# Patient Record
Sex: FEMALE | Race: OTHER | HISPANIC OR LATINO
[De-identification: names, ages, dates, MRNs, and addresses within clinical notes are randomized per-mention and may not be internally consistent; named-entity substitution may affect disease eponyms.]

---

## 2021-04-12 ENCOUNTER — TRANSCRIPTION ENCOUNTER (OUTPATIENT)
Age: 46
End: 2021-04-12

## 2022-01-22 ENCOUNTER — EMERGENCY (EMERGENCY)
Facility: HOSPITAL | Age: 47
LOS: 1 days | Discharge: HOME | End: 2022-01-22
Attending: EMERGENCY MEDICINE | Admitting: EMERGENCY MEDICINE
Payer: COMMERCIAL

## 2022-01-22 VITALS
OXYGEN SATURATION: 100 % | DIASTOLIC BLOOD PRESSURE: 56 MMHG | HEART RATE: 59 BPM | TEMPERATURE: 98 F | SYSTOLIC BLOOD PRESSURE: 101 MMHG | RESPIRATION RATE: 17 BRPM

## 2022-01-22 VITALS
HEART RATE: 78 BPM | RESPIRATION RATE: 18 BRPM | TEMPERATURE: 98 F | SYSTOLIC BLOOD PRESSURE: 137 MMHG | DIASTOLIC BLOOD PRESSURE: 82 MMHG | OXYGEN SATURATION: 100 %

## 2022-01-22 LAB
ALBUMIN SERPL ELPH-MCNC: 4.6 G/DL — SIGNIFICANT CHANGE UP (ref 3.3–5)
ALP SERPL-CCNC: 72 U/L — SIGNIFICANT CHANGE UP (ref 40–120)
ALT FLD-CCNC: 14 U/L — SIGNIFICANT CHANGE UP (ref 4–33)
ANION GAP SERPL CALC-SCNC: 11 MMOL/L — SIGNIFICANT CHANGE UP (ref 7–14)
APPEARANCE UR: ABNORMAL
AST SERPL-CCNC: 15 U/L — SIGNIFICANT CHANGE UP (ref 4–32)
BASOPHILS # BLD AUTO: 0.04 K/UL — SIGNIFICANT CHANGE UP (ref 0–0.2)
BASOPHILS NFR BLD AUTO: 0.3 % — SIGNIFICANT CHANGE UP (ref 0–2)
BILIRUB SERPL-MCNC: 0.6 MG/DL — SIGNIFICANT CHANGE UP (ref 0.2–1.2)
BILIRUB UR-MCNC: NEGATIVE — SIGNIFICANT CHANGE UP
BUN SERPL-MCNC: 14 MG/DL — SIGNIFICANT CHANGE UP (ref 7–23)
CALCIUM SERPL-MCNC: 9.3 MG/DL — SIGNIFICANT CHANGE UP (ref 8.4–10.5)
CHLORIDE SERPL-SCNC: 100 MMOL/L — SIGNIFICANT CHANGE UP (ref 98–107)
CO2 SERPL-SCNC: 25 MMOL/L — SIGNIFICANT CHANGE UP (ref 22–31)
COLOR SPEC: YELLOW — SIGNIFICANT CHANGE UP
CREAT SERPL-MCNC: 0.7 MG/DL — SIGNIFICANT CHANGE UP (ref 0.5–1.3)
DIFF PNL FLD: ABNORMAL
EOSINOPHIL # BLD AUTO: 0.07 K/UL — SIGNIFICANT CHANGE UP (ref 0–0.5)
EOSINOPHIL NFR BLD AUTO: 0.6 % — SIGNIFICANT CHANGE UP (ref 0–6)
EPI CELLS # UR: 4 /HPF — SIGNIFICANT CHANGE UP (ref 0–5)
GLUCOSE SERPL-MCNC: 115 MG/DL — HIGH (ref 70–99)
GLUCOSE UR QL: NEGATIVE — SIGNIFICANT CHANGE UP
HCT VFR BLD CALC: 40.1 % — SIGNIFICANT CHANGE UP (ref 34.5–45)
HGB BLD-MCNC: 13.5 G/DL — SIGNIFICANT CHANGE UP (ref 11.5–15.5)
IANC: 10.1 K/UL — HIGH (ref 1.5–8.5)
IMM GRANULOCYTES NFR BLD AUTO: 0.2 % — SIGNIFICANT CHANGE UP (ref 0–1.5)
KETONES UR-MCNC: ABNORMAL
LEUKOCYTE ESTERASE UR-ACNC: NEGATIVE — SIGNIFICANT CHANGE UP
LIDOCAIN IGE QN: 36 U/L — SIGNIFICANT CHANGE UP (ref 7–60)
LYMPHOCYTES # BLD AUTO: 1.2 K/UL — SIGNIFICANT CHANGE UP (ref 1–3.3)
LYMPHOCYTES # BLD AUTO: 10.2 % — LOW (ref 13–44)
MCHC RBC-ENTMCNC: 30.3 PG — SIGNIFICANT CHANGE UP (ref 27–34)
MCHC RBC-ENTMCNC: 33.7 GM/DL — SIGNIFICANT CHANGE UP (ref 32–36)
MCV RBC AUTO: 89.9 FL — SIGNIFICANT CHANGE UP (ref 80–100)
MONOCYTES # BLD AUTO: 0.37 K/UL — SIGNIFICANT CHANGE UP (ref 0–0.9)
MONOCYTES NFR BLD AUTO: 3.1 % — SIGNIFICANT CHANGE UP (ref 2–14)
NEUTROPHILS # BLD AUTO: 10.1 K/UL — HIGH (ref 1.8–7.4)
NEUTROPHILS NFR BLD AUTO: 85.6 % — HIGH (ref 43–77)
NITRITE UR-MCNC: NEGATIVE — SIGNIFICANT CHANGE UP
NRBC # BLD: 0 /100 WBCS — SIGNIFICANT CHANGE UP
NRBC # FLD: 0 K/UL — SIGNIFICANT CHANGE UP
PH UR: 8.5 — HIGH (ref 5–8)
PLATELET # BLD AUTO: 352 K/UL — SIGNIFICANT CHANGE UP (ref 150–400)
POTASSIUM SERPL-MCNC: 4.3 MMOL/L — SIGNIFICANT CHANGE UP (ref 3.5–5.3)
POTASSIUM SERPL-SCNC: 4.3 MMOL/L — SIGNIFICANT CHANGE UP (ref 3.5–5.3)
PROT SERPL-MCNC: 7.3 G/DL — SIGNIFICANT CHANGE UP (ref 6–8.3)
PROT UR-MCNC: ABNORMAL
RBC # BLD: 4.46 M/UL — SIGNIFICANT CHANGE UP (ref 3.8–5.2)
RBC # FLD: 13.9 % — SIGNIFICANT CHANGE UP (ref 10.3–14.5)
RBC CASTS # UR COMP ASSIST: 35 /HPF — HIGH (ref 0–4)
SARS-COV-2 RNA SPEC QL NAA+PROBE: SIGNIFICANT CHANGE UP
SODIUM SERPL-SCNC: 136 MMOL/L — SIGNIFICANT CHANGE UP (ref 135–145)
SP GR SPEC: 1.03 — SIGNIFICANT CHANGE UP (ref 1–1.05)
UROBILINOGEN FLD QL: SIGNIFICANT CHANGE UP
WBC # BLD: 11.8 K/UL — HIGH (ref 3.8–10.5)
WBC # FLD AUTO: 11.8 K/UL — HIGH (ref 3.8–10.5)
WBC UR QL: 3 /HPF — SIGNIFICANT CHANGE UP (ref 0–5)

## 2022-01-22 PROCEDURE — 99285 EMERGENCY DEPT VISIT HI MDM: CPT

## 2022-01-22 PROCEDURE — 74176 CT ABD & PELVIS W/O CONTRAST: CPT | Mod: 26,MA

## 2022-01-22 RX ORDER — SODIUM CHLORIDE 9 MG/ML
1000 INJECTION, SOLUTION INTRAVENOUS ONCE
Refills: 0 | Status: COMPLETED | OUTPATIENT
Start: 2022-01-22 | End: 2022-01-22

## 2022-01-22 RX ORDER — MORPHINE SULFATE 50 MG/1
4 CAPSULE, EXTENDED RELEASE ORAL ONCE
Refills: 0 | Status: DISCONTINUED | OUTPATIENT
Start: 2022-01-22 | End: 2022-01-22

## 2022-01-22 RX ORDER — ONDANSETRON 8 MG/1
4 TABLET, FILM COATED ORAL ONCE
Refills: 0 | Status: COMPLETED | OUTPATIENT
Start: 2022-01-22 | End: 2022-01-22

## 2022-01-22 RX ORDER — KETOROLAC TROMETHAMINE 30 MG/ML
15 SYRINGE (ML) INJECTION ONCE
Refills: 0 | Status: DISCONTINUED | OUTPATIENT
Start: 2022-01-22 | End: 2022-01-22

## 2022-01-22 RX ADMIN — Medication 15 MILLIGRAM(S): at 14:47

## 2022-01-22 RX ADMIN — SODIUM CHLORIDE 1000 MILLILITER(S): 9 INJECTION, SOLUTION INTRAVENOUS at 11:29

## 2022-01-22 RX ADMIN — SODIUM CHLORIDE 1000 MILLILITER(S): 9 INJECTION, SOLUTION INTRAVENOUS at 12:12

## 2022-01-22 NOTE — ED PROVIDER NOTE - CLINICAL SUMMARY MEDICAL DECISION MAKING FREE TEXT BOX
Anibal Najera, PGY-2- 46 year old female with a pmhx of sinusitis, tummy tuck, here for evaluation of RLQ abd pain, rt flank pain. Vitals stable on arrival. Point tender to RLQ. Pt otherwise well-appearing. Plan to obtain cbc, cmp, lipase, ua, ucx, hcg, treat with IVF, zofran, pain meds. Pt pain resolved after urinating in ED. CT abd/pelv with iv contrast changed to ct abd/pelv without contrast. suspect renal stone

## 2022-01-22 NOTE — ED PROVIDER NOTE - PHYSICAL EXAMINATION
General: well appearing, no acute distress, AOx3  Skin: no rash, no pallor  Head: normocephalic, atraumatic  Eyes: clear conjunctiva, EOMI  ENMT: airway patent, no nasal discharge  Cardiovascular: normal rate, normal rhythm, S1/S2  Pulmonary: clear to auscultation bilaterally, no rales, rhonchi, or wheeze  Abdomen: soft, point tender at RLQ, mild CVA tenderness  : Chaperoned by ED ANDREAS Obrien - no CMT, no adnexal masses/tenderness appreciated, I&D site performed at rt labia minora at OSH yesterday is well-appearing, non tender, no erythema   Musculoskeletal: moving extremities well, no deformity  Psych: normal mood, normal affect

## 2022-01-22 NOTE — ED PROVIDER NOTE - OBJECTIVE STATEMENT
46 year old female with a pmhx of sinusitis, on Duprixin (MAB therapy), tummy tuck,  presents to ED for evaluation of R flank pain radiating to RLQ that began this morning at 0300. Patient reports pain woke her up from sleep. Took a combination Advil/Acetaminophen, which helped her pain. Had a few episodes of nausea and vomiting with pain. Went to  who referred her to ED. Pt reports pain gradually improving since arrival to ED. Has not taken pain meds since 0400. No fever, chills, cp, sob, cough, vaginal bleeding, dysuria. LMP finished 2 days ago. No hx of renal stones. NKDA. 46 year old female with a pmhx of sinusitis, on Duprixin (MAB therapy), tummy tuck,  presents to ED for evaluation of R flank pain radiating to RLQ that began this morning at 0300. Patient reports pain woke her up from sleep. Took a combination Advil/Acetaminophen, which helped her pain. Had a few episodes of nausea and vomiting with pain. Went to  who referred her to ED. Pt reports pain gradually improving since arrival to ED. Has not taken pain meds since 0400. No fever, chills, cp, sob, cough, vaginal bleeding, dysuria. LMP finished 2 days ago. No hx of renal stones. NKDA.    Attendinyo female presents with right flank pain which has been sharp, constant and severe since 3am today.  no fever or chills.  pain not worse with movement.  of note, pt states she went to the bathroom just prior to my evaluation and pt states pain abruptly resolved.  no pain at this time.  had some burning with urination.

## 2022-01-22 NOTE — ED ADULT NURSE REASSESSMENT NOTE - NS ED NURSE REASSESS COMMENT FT1
Pt denies pain at this time stating "I haven't had pain for three hours". Pt afebrile at this time. Pt refusing toradol medication. Will continue to monitor

## 2022-01-22 NOTE — ED PROVIDER NOTE - PROGRESS NOTE DETAILS
Anibal Najera, PGY-2- Patient urinated in ED, states her pain is immediately resolved. Has no pain now and declined Morphine. Pt received IVF, suspect passed renal stone in ED. Will switch CT to non-con. Abd is soft, nontender Anibal Najera, PGY-2- Patient with 2 mm distal ureter stone on rt. Pt refused Toradol. Will dc home with urine strainer and urology f/u. Discussed results of work up with patient. Patient agrees with plan to discharge home. All questions answered regarding plan. Strict return precautions given.

## 2022-01-22 NOTE — ED ADULT NURSE NOTE - CHIEF COMPLAINT QUOTE
Pt AOX4 c/o Right-sided flank pain; sent from [facility] for further eval; woke her up at 0400, had 2-3 episodes of vomiting; pt having urinary frequency but low output; pt taking Flonase and Duprixin x 3 months sinusitis/polyps; No PSHx

## 2022-01-22 NOTE — ED PROVIDER NOTE - NSFOLLOWUPINSTRUCTIONS_ED_ALL_ED_FT
1) Follow up with your PCP and a urologist within 1 week of being seen in the Emergency Department   2) Return to the ED immediately for new or worsening symptoms severe pain, vomiting, unable to eat/drink, unable to urinate, fever   3) Please continue to take any home medications as prescribed  4) Your test results from your ED visit were discussed with you prior to discharge  5) You were provided with a copy of your test results  6) Please take Ibuprofen 400 mg every 6 hours and Tylenol 650 mg every 6 hours for pain. Please follow all pharmacy packaging instructions and warnings. Please take the Ibuprofen with food. Please drink plenty if water and stay hydrated to help pass the stone. Please strain urine at home. Bring stone to urologist with you if you pass it.   7) Incidental finding on CT- 3 mm right middle lobe pulmonary nodule, please follow up with PCP for further evaluation and surveillance

## 2022-01-22 NOTE — ED PROVIDER NOTE - NSFOLLOWUPCLINICS_GEN_ALL_ED_FT
[facility]  Urology  [address] [place] [address]  Phone: [phone]  Fax:   Follow Up Time: 4-6 Days

## 2022-01-22 NOTE — ED PROVIDER NOTE - NS ED ROS FT
General: no fever, no chills  Eyes: no vision changes, no eye pain  Cardiovascular: no chest pain, no edema  Respiratory: no cough, no shortness of breath  Gastrointestinal: +abdominal pain, +flank pain, +nausea, +vomiting, no diarrhea  Genitourinary: +dysuria, no hematuria  Musculoskeletal: no muscle pain, no joint pain  Skin: no rash, no lesions  Neuro: no numbness, no tingling  Psych: no depression, no anxiety

## 2022-01-22 NOTE — ED PROVIDER NOTE - PATIENT PORTAL LINK FT
You can access the FollowMyHealth Patient Portal offered by [facility] by registering at the following website: [email or web address]. By joining [facility]’s FollowMyHealth portal, you will also be able to view your health information using other applications (apps) compatible with our system.

## 2022-01-22 NOTE — ED ADULT TRIAGE NOTE - CHIEF COMPLAINT QUOTE
Pt AOX4 c/o Right-sided flank pain; woke her up at 0400, had 2-3 episodes of vomiting; pt having urinary frequency but low output; pt taking Flonase and Duprixin x 3 months sinusitis/polyps Pt AOX4 c/o Right-sided flank pain; sent from [facility] for further eval; woke her up at 0400, had 2-3 episodes of vomiting; pt having urinary frequency but low output; pt taking Flonase and Duprixin x 3 months sinusitis/polyps; No PSHx

## 2022-01-23 LAB
CULTURE RESULTS: SIGNIFICANT CHANGE UP
SPECIMEN SOURCE: SIGNIFICANT CHANGE UP

## 2022-01-26 PROBLEM — J32.9 CHRONIC SINUSITIS, UNSPECIFIED: Chronic | Status: ACTIVE | Noted: 2022-01-22

## 2022-02-01 ENCOUNTER — APPOINTMENT (OUTPATIENT)
Dept: UROLOGY | Facility: CLINIC | Age: 47
End: 2022-02-01
Payer: COMMERCIAL

## 2022-02-01 DIAGNOSIS — Z87.09 PERSONAL HISTORY OF OTHER DISEASES OF THE RESPIRATORY SYSTEM: ICD-10-CM

## 2022-02-01 DIAGNOSIS — Z80.8 FAMILY HISTORY OF MALIGNANT NEOPLASM OF OTHER ORGANS OR SYSTEMS: ICD-10-CM

## 2022-02-01 DIAGNOSIS — F17.210 NICOTINE DEPENDENCE, CIGARETTES, UNCOMPLICATED: ICD-10-CM

## 2022-02-01 PROBLEM — Z00.00 ENCOUNTER FOR PREVENTIVE HEALTH EXAMINATION: Status: ACTIVE | Noted: 2022-02-01

## 2022-02-01 PROCEDURE — 99204 OFFICE O/P NEW MOD 45 MIN: CPT

## 2022-02-01 PROCEDURE — 99406 BEHAV CHNG SMOKING 3-10 MIN: CPT

## 2022-02-01 PROCEDURE — 99203 OFFICE O/P NEW LOW 30 MIN: CPT

## 2022-02-01 RX ORDER — FLUTICASONE PROPIONATE 50 UG/1
50 SPRAY, METERED NASAL
Qty: 16 | Refills: 0 | Status: ACTIVE | COMMUNITY
Start: 2021-10-19

## 2022-02-01 RX ORDER — DUPILUMAB 300 MG/2ML
300 INJECTION, SOLUTION SUBCUTANEOUS
Qty: 12 | Refills: 0 | Status: ACTIVE | COMMUNITY
Start: 2021-12-15

## 2022-02-01 RX ORDER — AMOXICILLIN AND CLAVULANATE POTASSIUM 875; 125 MG/1; MG/1
875-125 TABLET, COATED ORAL
Qty: 20 | Refills: 0 | Status: COMPLETED | COMMUNITY
Start: 2021-10-12

## 2022-02-01 NOTE — REVIEW OF SYSTEMS
[Negative] : Heme/Lymph [FreeTextEntry6] : kidney stones on right side / burning sensation with urinating

## 2022-02-01 NOTE — ASSESSMENT
[FreeTextEntry1] : Ms. Garduno is a 46 year old woman here today for evaluation for right ureteral stone, right flank pain\par Patient was counseled on smoking cessation for 5 minutes.  We discussed various health benefits of quitting.  We discussed the potential Urologic implications of smoking, including an increased risk of bladder and upper tract urothelial carcinoma as well as kidney cancer.\par She reports that 2 weeks ago she went to the ED for severe right side pain.\par She reports she was vomiting as well, no fevers or chills.\par UC on 1/23/22 was negative.\par CT images reviewed demonstrating a 2 mm distal right ureteral stone with mild right hydronephrosis.\par She reports the her pain has since resolved but occasionally feels slight discomfort.\par She reports dysuria but denies hematuria.\par \par UC today to rule out infection.\par Tamsulosin 0.4 mg to help for passage of stone.\par I discussed the risks, benefits, alternatives, and possible side effects of alpha blocker therapy with the patient, including but not limited to dizziness, lightheadedness, headache, blurred vision with the patient. I also discussed that the patient must inform her ophthalmologist that she has taken an alpha blocker prior to undergoing cataract or glaucoma surgery.\par CT without contrast for evaluation of stone passage.\par RTO in 3 weeks.\par Follow up with PCP about incidental finding of 3mm lung nodule.  We discussed the potential etiology of this, especially in a smoker\par

## 2022-02-01 NOTE — HISTORY OF PRESENT ILLNESS
[Currently Experiencing ___] :  [unfilled] [Flank Pain] : flank pain [Dysuria] : dysuria [None] : None [FreeTextEntry1] : Ms. Garduno is a 46 year old woman here today for evaluation for right ureteral stone and right flank pain.\par She reports that 2 weeks ago she went to the ED for severe right sided flank pain.\par She reports she was vomiting as well, no fevers or chills.\par UC on 1/23/22 was negative.\par CT on 1/23/22 showed 2 mm distal right ureteral stone with mild right hydronephrosis.\par She reports the her pain has since resolved but occasionally feels slight discomfort.\par She reports dysuria but denies hematuria.\par Reports that she drinks 7-10 12 oz water bottles per day.\par She is currently straining her urine but has not seen a stone pass.  She reports recurrent pain approximately 1 week ago.\par She denies familial history of kidney stones.\par Reports that she smokes 3-4 cigarettes a day.\par \par

## 2022-02-03 LAB — BACTERIA UR CULT: NORMAL

## 2022-02-08 ENCOUNTER — RESULT REVIEW (OUTPATIENT)
Age: 47
End: 2022-02-08

## 2022-02-28 ENCOUNTER — APPOINTMENT (OUTPATIENT)
Dept: UROLOGY | Facility: CLINIC | Age: 47
End: 2022-02-28

## 2022-03-07 ENCOUNTER — APPOINTMENT (OUTPATIENT)
Dept: UROLOGY | Facility: CLINIC | Age: 47
End: 2022-03-07
Payer: COMMERCIAL

## 2022-03-07 VITALS
RESPIRATION RATE: 12 BRPM | HEART RATE: 79 BPM | WEIGHT: 142 LBS | SYSTOLIC BLOOD PRESSURE: 121 MMHG | DIASTOLIC BLOOD PRESSURE: 76 MMHG | TEMPERATURE: 96.3 F | BODY MASS INDEX: 27.88 KG/M2 | OXYGEN SATURATION: 98 % | HEIGHT: 60 IN

## 2022-03-07 DIAGNOSIS — N20.1 CALCULUS OF URETER: ICD-10-CM

## 2022-03-07 DIAGNOSIS — R10.9 UNSPECIFIED ABDOMINAL PAIN: ICD-10-CM

## 2022-03-07 DIAGNOSIS — Z87.442 PERSONAL HISTORY OF URINARY CALCULI: ICD-10-CM

## 2022-03-07 PROCEDURE — 99213 OFFICE O/P EST LOW 20 MIN: CPT

## 2022-03-07 NOTE — ASSESSMENT
[FreeTextEntry1] : Very pleasant 46-year-old woman presents for follow-up of right ureteral stone status post spontaneous stone passage\par -CT images from [facility] radiology reviewed demonstrating no kidney or ureteral stones\par -We discussed general stone prevention strategies\par -We discussed option of perform a work-up for stones at this time, however she would like to defer this\par -Repeat renal ultrasound in 6 months

## 2022-03-07 NOTE — HISTORY OF PRESENT ILLNESS
[FreeTextEntry1] : Very pleasant 46-year-old woman presents for follow-up of right flank pain and right ureteral stone.  She recently went to the emergency department complaining of right flank pain.  A CT scan was performed which demonstrated a 2 mm distal right ureteral stone.  Currently she reports no pain.  She recently underwent a repeat CT scan which demonstrates resolution of hydronephrosis and no evidence of a kidney or ureteral stone.  No other complaints.

## 2022-08-10 ENCOUNTER — APPOINTMENT (OUTPATIENT)
Dept: INTERNAL MEDICINE | Facility: CLINIC | Age: 47
End: 2022-08-10
Payer: COMMERCIAL

## 2022-08-10 ENCOUNTER — NON-APPOINTMENT (OUTPATIENT)
Age: 47
End: 2022-08-10

## 2022-08-10 VITALS
HEART RATE: 72 BPM | TEMPERATURE: 98 F | OXYGEN SATURATION: 98 % | SYSTOLIC BLOOD PRESSURE: 96 MMHG | WEIGHT: 143.06 LBS | BODY MASS INDEX: 28.09 KG/M2 | HEIGHT: 60 IN | DIASTOLIC BLOOD PRESSURE: 65 MMHG

## 2022-08-10 DIAGNOSIS — L30.9 DERMATITIS, UNSPECIFIED: ICD-10-CM

## 2022-08-10 DIAGNOSIS — H92.09 OTALGIA, UNSPECIFIED EAR: ICD-10-CM

## 2022-08-10 PROCEDURE — 99214 OFFICE O/P EST MOD 30 MIN: CPT | Mod: 25

## 2022-08-10 PROCEDURE — 99213 OFFICE O/P EST LOW 20 MIN: CPT

## 2022-08-10 PROCEDURE — 99396 PREV VISIT EST AGE 40-64: CPT | Mod: 25

## 2022-08-10 PROCEDURE — 36415 COLL VENOUS BLD VENIPUNCTURE: CPT

## 2022-08-10 RX ORDER — CEPHALEXIN 500 MG/1
500 CAPSULE ORAL
Qty: 14 | Refills: 0 | Status: DISCONTINUED | COMMUNITY
Start: 2022-07-13

## 2022-08-10 RX ORDER — AZELASTINE HYDROCHLORIDE 137 UG/1
0.1 SPRAY, METERED NASAL
Qty: 30 | Refills: 0 | Status: DISCONTINUED | COMMUNITY
Start: 2022-06-30

## 2022-08-10 RX ORDER — PREDNISONE 20 MG/1
20 TABLET ORAL
Qty: 21 | Refills: 0 | Status: DISCONTINUED | COMMUNITY
Start: 2021-10-19 | End: 2022-08-10

## 2022-08-10 RX ORDER — MOMETASONE FUROATE 1 MG/G
0.1 OINTMENT TOPICAL
Qty: 45 | Refills: 0 | Status: DISCONTINUED | COMMUNITY
Start: 2022-08-03

## 2022-08-10 RX ORDER — IBUPROFEN 600 MG/1
600 TABLET, FILM COATED ORAL 3 TIMES DAILY
Qty: 63 | Refills: 0 | Status: DISCONTINUED | COMMUNITY
Start: 2022-02-01 | End: 2022-08-10

## 2022-08-10 RX ORDER — AZITHROMYCIN 250 MG/1
250 TABLET, FILM COATED ORAL
Qty: 6 | Refills: 0 | Status: DISCONTINUED | COMMUNITY
Start: 2022-06-30

## 2022-08-10 RX ORDER — CLOBETASOL PROPIONATE 0.5 MG/G
0.05 CREAM TOPICAL
Qty: 60 | Refills: 0 | Status: DISCONTINUED | COMMUNITY
Start: 2022-07-13

## 2022-08-10 RX ORDER — OLOPATADINE HCL 1 MG/ML
0.1 SOLUTION/ DROPS OPHTHALMIC
Qty: 5 | Refills: 0 | Status: DISCONTINUED | COMMUNITY
Start: 2021-11-24 | End: 2022-08-10

## 2022-08-10 RX ORDER — LEVOFLOXACIN 500 MG/1
500 TABLET, FILM COATED ORAL
Qty: 10 | Refills: 0 | Status: DISCONTINUED | COMMUNITY
Start: 2022-03-08

## 2022-08-10 NOTE — HEALTH RISK ASSESSMENT
[Good] : ~his/her~  mood as  good [Current] : Current [Yes] : Yes [Monthly or less (1 pt)] : Monthly or less (1 point) [1 or 2 (0 pts)] : 1 or 2 (0 points) [Never (0 pts)] : Never (0 points) [No] : In the past 12 months have you used drugs other than those required for medical reasons? No [0] : 2) Feeling down, depressed, or hopeless: Not at all (0) [PHQ-2 Negative - No further assessment needed] : PHQ-2 Negative - No further assessment needed [Patient reported mammogram was normal] : Patient reported mammogram was normal [Audit-CScore] : 1 [OBS5Gitir] : 0 [MammogramDate] : 2021

## 2022-08-10 NOTE — ADDENDUM
[FreeTextEntry1] : I, Helga Garcia, acted as a scribe on behalf of Dr. Joseph Fry MD, on 08/10/2022. \par \par All medical entries made by the scribe were at my, Dr. Joseph Fry MD, direction and personally dictated by me on 08/10/2022. I have reviewed the chart and agree that the record accurately reflects my personal performance of the history, physical exam, assessment and plan. I have also personally directed, reviewed, and agreed with the chart.

## 2022-08-10 NOTE — ASSESSMENT
[FreeTextEntry1] : Annual Physical Exam\par -Blood work done today\par -Check A1c, lipid panel and Vitamin levels.\par -EKG done today with no abnormalities.\par -CT Scan ordered given lung nodule and smoking Hx likely benign given size. Advised to see pulmonology for further evaluation.\par -Advised to f/u with urology given increased frequency. Referral provided.\par -GI referral given for colonoscopy.\par -RTO annually or as needed

## 2022-08-10 NOTE — HISTORY OF PRESENT ILLNESS
[FreeTextEntry1] : Patient presents to establish care and annual physical exam.  [de-identified] : A 47 y/o pt presents to office with Hx of sinusitis and kidney stone.\par Pt reports she went to ED for kidney stone back in 6 months. Had XR and incidental finding of nodule on lung.\par States she saw urologist and has passed the kidney stone.\par Does cough sometimes. Previously smoked 1-2 cigarettes a day. Currently smokes 3-4 cigarettes a day. Smoking for about 19 years. \par Has tried nicotine patch in the past with no success.\par Also Numbness and tingling of the hands and arm. Denies any weakness.\par \par Pt notes of recurring sinus infection, does have polyp. Had sinus surgery in the past. Unable to smell well.\par Also c/o Increased frequency urination. Does wake up 2-3 x at night to urinate\par Plans to f/u with urologist. Drinks 1 cup of coffee once a day\par Notes of L ear itching for the past few days. Denies any earache.\par States she gained weight. She has been watching her diet more closely and stays active. Taking vitamin supplementation.\par Saw dermatology for small lump on foot. Cream given with no alleviation. \par Has burning sensation upon palpation. Denies itchiness.\par She has been using Vaseline, with minimal alleviation.\par UTD with mammogram annually and GYN. Interested in colonoscopy.\par FHx: Sister Thyroid Ca,

## 2022-08-10 NOTE — REVIEW OF SYSTEMS
[Negative] : Heme/Lymph [Cough] : cough [FreeTextEntry4] : L ear itching  [FreeTextEntry8] : Increased frequency [de-identified] : Small lump on L foot.

## 2022-08-10 NOTE — PHYSICAL EXAM
[No Acute Distress] : no acute distress [Well Nourished] : well nourished [Well Developed] : well developed [Well-Appearing] : well-appearing [Normal Sclera/Conjunctiva] : normal sclera/conjunctiva [PERRL] : pupils equal round and reactive to light [EOMI] : extraocular movements intact [Normal Outer Ear/Nose] : the outer ears and nose were normal in appearance [Normal Oropharynx] : the oropharynx was normal [No JVD] : no jugular venous distention [No Lymphadenopathy] : no lymphadenopathy [Supple] : supple [Thyroid Normal, No Nodules] : the thyroid was normal and there were no nodules present [No Respiratory Distress] : no respiratory distress  [No Accessory Muscle Use] : no accessory muscle use [Clear to Auscultation] : lungs were clear to auscultation bilaterally [Normal Rate] : normal rate  [Regular Rhythm] : with a regular rhythm [Normal S1, S2] : normal S1 and S2 [No Murmur] : no murmur heard [No Carotid Bruits] : no carotid bruits [No Abdominal Bruit] : a ~M bruit was not heard ~T in the abdomen [No Varicosities] : no varicosities [Pedal Pulses Present] : the pedal pulses are present [No Edema] : there was no peripheral edema [No Palpable Aorta] : no palpable aorta [No Extremity Clubbing/Cyanosis] : no extremity clubbing/cyanosis [Soft] : abdomen soft [Non Tender] : non-tender [Non-distended] : non-distended [No Masses] : no abdominal mass palpated [No HSM] : no HSM [Normal Bowel Sounds] : normal bowel sounds [Normal Posterior Cervical Nodes] : no posterior cervical lymphadenopathy [Normal Anterior Cervical Nodes] : no anterior cervical lymphadenopathy [No CVA Tenderness] : no CVA  tenderness [No Spinal Tenderness] : no spinal tenderness [No Joint Swelling] : no joint swelling [Grossly Normal Strength/Tone] : grossly normal strength/tone [No Rash] : no rash [Coordination Grossly Intact] : coordination grossly intact [No Focal Deficits] : no focal deficits [Normal Gait] : normal gait [Deep Tendon Reflexes (DTR)] : deep tendon reflexes were 2+ and symmetric [Normal Affect] : the affect was normal [Normal Insight/Judgement] : insight and judgment were intact [de-identified] : patch medial aspect right foot

## 2022-08-15 LAB
25(OH)D3 SERPL-MCNC: 28.6 NG/ML
ALBUMIN SERPL ELPH-MCNC: 4.9 G/DL
ALP BLD-CCNC: 68 U/L
ALT SERPL-CCNC: 15 U/L
ANION GAP SERPL CALC-SCNC: 12 MMOL/L
APPEARANCE: CLEAR
AST SERPL-CCNC: 14 U/L
BACTERIA: NEGATIVE
BASOPHILS # BLD AUTO: 0.06 K/UL
BASOPHILS NFR BLD AUTO: 0.8 %
BILIRUB SERPL-MCNC: 0.5 MG/DL
BILIRUBIN URINE: NEGATIVE
BLOOD URINE: NEGATIVE
BUN SERPL-MCNC: 11 MG/DL
CALCIUM OXALATE CRYSTALS: ABNORMAL
CALCIUM SERPL-MCNC: 10.5 MG/DL
CHLORIDE SERPL-SCNC: 103 MMOL/L
CHOLEST SERPL-MCNC: 282 MG/DL
CO2 SERPL-SCNC: 26 MMOL/L
COLOR: YELLOW
CREAT SERPL-MCNC: 0.77 MG/DL
EGFR: 97 ML/MIN/1.73M2
EOSINOPHIL # BLD AUTO: 0.78 K/UL
EOSINOPHIL NFR BLD AUTO: 10.3 %
ESTIMATED AVERAGE GLUCOSE: 108 MG/DL
GLUCOSE QUALITATIVE U: NEGATIVE
GLUCOSE SERPL-MCNC: 76 MG/DL
HBA1C MFR BLD HPLC: 5.4 %
HCT VFR BLD CALC: 41.9 %
HDLC SERPL-MCNC: 77 MG/DL
HGB BLD-MCNC: 13.6 G/DL
HYALINE CASTS: 1 /LPF
IMM GRANULOCYTES NFR BLD AUTO: 0.3 %
KETONES URINE: NORMAL
LDLC SERPL CALC-MCNC: 167 MG/DL
LEUKOCYTE ESTERASE URINE: NEGATIVE
LYMPHOCYTES # BLD AUTO: 2.32 K/UL
LYMPHOCYTES NFR BLD AUTO: 30.7 %
MAN DIFF?: NORMAL
MCHC RBC-ENTMCNC: 29.9 PG
MCHC RBC-ENTMCNC: 32.5 GM/DL
MCV RBC AUTO: 92.1 FL
MICROSCOPIC-UA: NORMAL
MONOCYTES # BLD AUTO: 0.48 K/UL
MONOCYTES NFR BLD AUTO: 6.4 %
NEUTROPHILS # BLD AUTO: 3.89 K/UL
NEUTROPHILS NFR BLD AUTO: 51.5 %
NITRITE URINE: NEGATIVE
NONHDLC SERPL-MCNC: 205 MG/DL
PH URINE: 6
PLATELET # BLD AUTO: 337 K/UL
POTASSIUM SERPL-SCNC: 4.7 MMOL/L
PROT SERPL-MCNC: 7.3 G/DL
PROTEIN URINE: ABNORMAL
RBC # BLD: 4.55 M/UL
RBC # FLD: 14.5 %
RED BLOOD CELLS URINE: 4 /HPF
SODIUM SERPL-SCNC: 140 MMOL/L
SPECIFIC GRAVITY URINE: 1.04
SQUAMOUS EPITHELIAL CELLS: 1 /HPF
TRIGL SERPL-MCNC: 190 MG/DL
TSH SERPL-ACNC: 1.76 UIU/ML
URINE COMMENTS: NORMAL
UROBILINOGEN URINE: NORMAL
VIT B12 SERPL-MCNC: >2000 PG/ML
WBC # FLD AUTO: 7.55 K/UL
WHITE BLOOD CELLS URINE: 0 /HPF

## 2022-08-18 ENCOUNTER — NON-APPOINTMENT (OUTPATIENT)
Age: 47
End: 2022-08-18

## 2022-08-24 ENCOUNTER — APPOINTMENT (OUTPATIENT)
Dept: CT IMAGING | Facility: CLINIC | Age: 47
End: 2022-08-24

## 2022-09-01 ENCOUNTER — APPOINTMENT (OUTPATIENT)
Dept: INTERNAL MEDICINE | Facility: CLINIC | Age: 47
End: 2022-09-01

## 2022-09-01 VITALS
SYSTOLIC BLOOD PRESSURE: 120 MMHG | BODY MASS INDEX: 29.38 KG/M2 | TEMPERATURE: 96.6 F | HEIGHT: 59.45 IN | DIASTOLIC BLOOD PRESSURE: 79 MMHG | HEART RATE: 74 BPM | OXYGEN SATURATION: 98 % | WEIGHT: 147.71 LBS

## 2022-09-01 DIAGNOSIS — S01.91XA LACERATION W/OUT FOREIGN BODY OF UNSPECIFIED PART OF HEAD, INITIAL ENCOUNTER: ICD-10-CM

## 2022-09-01 PROCEDURE — 99214 OFFICE O/P EST MOD 30 MIN: CPT

## 2022-09-02 NOTE — PHYSICAL EXAM
[Normal] : normal gait, coordination grossly intact, no focal deficits and deep tendon reflexes were 2+ and symmetric [de-identified] : Laceration left frontal region sutures seen, swelling under the left eye temporal region and frontal head

## 2022-09-02 NOTE — HISTORY OF PRESENT ILLNESS
[FreeTextEntry8] : Patient presents to the office after sustaining a fall, fall occurred getting up from a chair and tripping fell on head and lacerated head, states there was sure covering top of flowerpot.  Had sutures placed in [place], denies any fevers chills discharge was given anti-inflammatory and antibiotic.  Continues to have headache, in the left frontal region denies any nausea gait instability

## 2022-09-02 NOTE — ASSESSMENT
[FreeTextEntry1] : Wound appears well-healed, did advise to follow-up with plastic surgery for cosmetic purposes, to get [place] CT [place], patient continues to have headache rule out subdural hematoma. Name of plastic surgeon provided

## 2022-09-06 ENCOUNTER — APPOINTMENT (OUTPATIENT)
Dept: INTERNAL MEDICINE | Facility: CLINIC | Age: 47
End: 2022-09-06

## 2022-09-06 VITALS
BODY MASS INDEX: 28.45 KG/M2 | SYSTOLIC BLOOD PRESSURE: 109 MMHG | DIASTOLIC BLOOD PRESSURE: 73 MMHG | WEIGHT: 143 LBS | HEIGHT: 59.45 IN | HEART RATE: 67 BPM | OXYGEN SATURATION: 99 % | TEMPERATURE: 96.9 F

## 2022-09-06 PROCEDURE — 99213 OFFICE O/P EST LOW 20 MIN: CPT

## 2022-09-06 NOTE — ASSESSMENT
[FreeTextEntry1] : -Advised to use Vit E oil, Aloe vera for the wound.\par -Pt has a scheduled CT Scan in 1 week, sutures removed patient tolerated procedure well.\par -RTO in 2 days for suture removal of last 2 sutures

## 2022-09-06 NOTE — ADDENDUM
[FreeTextEntry1] : I, Helga Garcia, acted as a scribe on behalf of Dr. Joseph Fry MD, on 09/06/2022. \par \par All medical entries made by the scribe were at my, Dr. Joseph Fry MD, direction and personally dictated by me on 09/06/2022. I have reviewed the chart and agree that the record accurately reflects my personal performance of the history, physical exam, assessment and plan. I have also personally directed, reviewed, and agreed with the chart.

## 2022-09-06 NOTE — HISTORY OF PRESENT ILLNESS
[FreeTextEntry8] : Patient presents to office for suture removal.\par Pt is doing well overall and plans to go to plastic surgeon.\par Continues to have decreased sensation of the head particularly on frontal area.\par Reports she will be having CT Scan in 1 week.\par Pt will return to work on Thursday

## 2022-09-06 NOTE — PHYSICAL EXAM
[Normal] : no acute distress, well nourished, well developed and well-appearing [de-identified] : wound left frontal region, well healed, NO ttp, or erythema

## 2022-09-08 ENCOUNTER — APPOINTMENT (OUTPATIENT)
Dept: INTERNAL MEDICINE | Facility: CLINIC | Age: 47
End: 2022-09-08

## 2022-09-08 DIAGNOSIS — Z48.02 ENCOUNTER FOR REMOVAL OF SUTURES: ICD-10-CM

## 2022-09-08 PROCEDURE — 99213 OFFICE O/P EST LOW 20 MIN: CPT

## 2022-09-08 NOTE — ADDENDUM
[FreeTextEntry1] : I, Helga Garcia, acted as a scribe on behalf of Dr. Joseph Fry MD, on 09/08/2022. \par \par All medical entries made by the scribe were at my, Dr. Joseph Fry MD, direction and personally dictated by me on 09/08/2022. I have reviewed the chart and agree that the record accurately reflects my personal performance of the history, physical exam, assessment and plan. I have also personally directed, reviewed, and agreed with the chart.

## 2022-09-08 NOTE — HISTORY OF PRESENT ILLNESS
[FreeTextEntry1] : Patient presents for follow-up for suture removal. [de-identified] : Patient reports she still gets pounding headache and Lump on L forehead.\par She will return to work today.\par

## 2022-09-08 NOTE — PHYSICAL EXAM
[Normal] : no acute distress, well nourished, well developed and well-appearing [de-identified] : wound left upper forehead, well healed

## 2022-09-08 NOTE — ASSESSMENT
[FreeTextEntry1] : -Suture removed. Pt tolerated the procedure.\par -Continue to ice the area.\par -Pt will return to work today.\par

## 2022-09-16 ENCOUNTER — NON-APPOINTMENT (OUTPATIENT)
Age: 47
End: 2022-09-16

## 2022-09-22 ENCOUNTER — APPOINTMENT (OUTPATIENT)
Dept: INTERNAL MEDICINE | Facility: CLINIC | Age: 47
End: 2022-09-22

## 2022-09-22 VITALS
HEART RATE: 67 BPM | HEIGHT: 59.44 IN | WEIGHT: 143 LBS | TEMPERATURE: 97.9 F | DIASTOLIC BLOOD PRESSURE: 72 MMHG | BODY MASS INDEX: 28.45 KG/M2 | OXYGEN SATURATION: 98 % | SYSTOLIC BLOOD PRESSURE: 105 MMHG

## 2022-09-22 DIAGNOSIS — G44.309 POST-TRAUMATIC HEADACHE, UNSPECIFIED, NOT INTRACTABLE: ICD-10-CM

## 2022-09-22 PROCEDURE — 99213 OFFICE O/P EST LOW 20 MIN: CPT

## 2022-09-22 NOTE — ADDENDUM
[FreeTextEntry1] : I, Helga Garcia, acted as a scribe on behalf of Dr. Joseph Fry MD, on 09/22/2022. \par \par All medical entries made by the scribe were at my, Dr. Joseph Fry MD, direction and personally dictated by me on 09/22/2022. I have reviewed the chart and agree that the record accurately reflects my personal performance of the history, physical exam, assessment and plan. I have also personally directed, reviewed, and agreed with the chart.

## 2022-09-22 NOTE — PHYSICAL EXAM
[Normal] : normal gait, coordination grossly intact, no focal deficits and deep tendon reflexes were 2+ and symmetric [de-identified] : Well healed scar on L-forehead.

## 2022-09-22 NOTE — ASSESSMENT
[FreeTextEntry1] : Likely tension headache. stress, anxiety contributing.\par -Pt had 2 CT Scan which was unremarkable.\par -Declined talk therapy or medication.\par -Name of neurologist provided for further evaluation and recommendation.

## 2022-09-22 NOTE — HISTORY OF PRESENT ILLNESS
[FreeTextEntry8] : Patient c/o Worsening pounding headache particularly on frontal region a/w blurry vision while working. wishes to see neurologist. \par Occurs particularly at work. Took aspirin and currently have heartburn,\par Has tried Advil and Tylenol with no alleviation.\par Does have Nausea, vomiting. Denies any fever, neck stiffness, sore throat\par Reports she is Anxious at work and public transportations. 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

## 2022-10-13 ENCOUNTER — APPOINTMENT (OUTPATIENT)
Dept: GASTROENTEROLOGY | Facility: CLINIC | Age: 47
End: 2022-10-13

## 2022-10-13 VITALS
DIASTOLIC BLOOD PRESSURE: 66 MMHG | TEMPERATURE: 97.5 F | HEART RATE: 80 BPM | SYSTOLIC BLOOD PRESSURE: 104 MMHG | WEIGHT: 145 LBS | RESPIRATION RATE: 12 BRPM | OXYGEN SATURATION: 96 % | BODY MASS INDEX: 29.23 KG/M2 | HEIGHT: 59 IN

## 2022-10-13 DIAGNOSIS — Z12.11 ENCOUNTER FOR SCREENING FOR MALIGNANT NEOPLASM OF COLON: ICD-10-CM

## 2022-10-13 PROCEDURE — 99204 OFFICE O/P NEW MOD 45 MIN: CPT

## 2022-10-13 NOTE — CONSULT LETTER
[Dear  ___] : Dear  [unfilled], [Consult Letter:] : I had the pleasure of evaluating your patient, [unfilled]. [( Thank you for referring [unfilled] for consultation for _____ )] : Thank you for referring [unfilled] for consultation for [unfilled] [Please see my note below.] : Please see my note below. [Consult Closing:] : Thank you very much for allowing me to participate in the care of this patient.  If you have any questions, please do not hesitate to contact me. [Sincerely,] : Sincerely, [FreeTextEntry3] : Don\par \par Josue Cao MD\par \par Gastroenterology\par [facility]\par [facility]\par \par

## 2022-10-13 NOTE — HISTORY OF PRESENT ILLNESS
[FreeTextEntry1] : She is a 47 year old asymptomatic female referred  for a screening colonoscopy. She denies a family history of colon cancer.

## 2022-10-13 NOTE — ASSESSMENT
[FreeTextEntry1] : RABIA SAGASTUME was advised to undergo colonoscopy to which she agreed. The procedure will be performed in [facility] \par [facility] with the assistance of an anesthesiologist. The patient was given a Suprep preparation prescription and understood the \par procedure as it was explained to her. She was given a booklet distributed by the [employer]\par  Endoscopy explaining the procedure in detail and she understood the risks of the procedure not limited to infection, bleeding,\par perforation or non- diagnosis of colorectal cancer. She was advised that she could not drive home, if she chooses to\par  receive sedation.\par \par Further diagnostic and treatment recommendations will be based upon the procedure and any biopsies, if they are taken.\par \par Thank you for allowing me to participate in this [facility] health care.\par \par , Best personal regards -- Don\par

## 2022-12-07 ENCOUNTER — APPOINTMENT (OUTPATIENT)
Dept: INTERNAL MEDICINE | Facility: CLINIC | Age: 47
End: 2022-12-07

## 2022-12-07 VITALS
HEIGHT: 60 IN | BODY MASS INDEX: 27.98 KG/M2 | OXYGEN SATURATION: 96 % | WEIGHT: 142.51 LBS | TEMPERATURE: 98 F | DIASTOLIC BLOOD PRESSURE: 73 MMHG | SYSTOLIC BLOOD PRESSURE: 107 MMHG | HEART RATE: 73 BPM

## 2022-12-07 DIAGNOSIS — R42 DIZZINESS AND GIDDINESS: ICD-10-CM

## 2022-12-07 PROCEDURE — 99214 OFFICE O/P EST MOD 30 MIN: CPT

## 2022-12-07 NOTE — HISTORY OF PRESENT ILLNESS
[FreeTextEntry1] : Patient presents for follow-up.  [de-identified] : Patient c/o Intermittent Dizziness.\par Occurs even at rest and would sometimes last for hours. Describes as spinning sensation and pressure sensation.\par Also notes of hand numbness. Pt has not been to neurology.\par Does have nausea and some sensitivity particularly to cigarette.\par Feels more fatigue. Denies any palpitations.\par Denies any tinnitus, neck pain or headache.\par \par Continues to have L sided sensitivity. Tends to scratch particularly during shower.

## 2022-12-07 NOTE — PHYSICAL EXAM
[Normal] : the outer ears and nose were normal in appearance and the oropharynx was normal [de-identified] : no drop in orthostatic [de-identified] : Paraspinal cervical tenderness [de-identified] : [place]-Hallpike negative.

## 2022-12-07 NOTE — ADDENDUM
[FreeTextEntry1] : I, Helga Garcia, acted as a scribe on behalf of Dr. Joseph Fry MD, on 12/07/2022. \par \par All medical entries made by the scribe were at my, Dr. Joseph Fry MD, direction and personally dictated by me on 12/07/2022. I have reviewed the chart and agree that the record accurately reflects my personal performance of the history, physical exam, assessment and plan. I have also personally directed, reviewed, and agreed with the chart.

## 2022-12-07 NOTE — ASSESSMENT
[FreeTextEntry1] : Suspect to be tension headache. Less likely vertigo.\par -Given dizziness lasting for few hours and hand numbness. MRI ordered.\par -No orthostatic hypotension.\par -Advised use of Tylenol/Advil, Voltaren gel. Attempt heating pads.\par

## 2022-12-08 ENCOUNTER — APPOINTMENT (OUTPATIENT)
Dept: GASTROENTEROLOGY | Facility: AMBULATORY SURGERY CENTER | Age: 47
End: 2022-12-08

## 2022-12-08 PROCEDURE — G0121 COLON CA SCRN NOT HI RSK IND: CPT

## 2022-12-21 ENCOUNTER — APPOINTMENT (OUTPATIENT)
Dept: INTERNAL MEDICINE | Facility: CLINIC | Age: 47
End: 2022-12-21

## 2023-03-13 ENCOUNTER — APPOINTMENT (OUTPATIENT)
Dept: INTERNAL MEDICINE | Facility: CLINIC | Age: 48
End: 2023-03-13
Payer: COMMERCIAL

## 2023-03-13 VITALS
HEART RATE: 74 BPM | WEIGHT: 137 LBS | DIASTOLIC BLOOD PRESSURE: 80 MMHG | TEMPERATURE: 98.3 F | HEIGHT: 60 IN | BODY MASS INDEX: 26.9 KG/M2 | OXYGEN SATURATION: 98 % | SYSTOLIC BLOOD PRESSURE: 139 MMHG

## 2023-03-13 DIAGNOSIS — G44.209 TENSION-TYPE HEADACHE, UNSPECIFIED, NOT INTRACTABLE: ICD-10-CM

## 2023-03-13 PROCEDURE — 99214 OFFICE O/P EST MOD 30 MIN: CPT

## 2023-03-13 NOTE — ASSESSMENT
[FreeTextEntry1] : Most likely tension headache given the symptoms, patient does have increased stress with work, did have intracranial imaging that showed no pathology to assess for causes of increased intracranial pressure.  Advised to continue follow-up with therapist, advised exercise.  We will attempt amitriptyline given the tension headache increased anxiety and muscle spasm, PT ordered.

## 2023-03-13 NOTE — PHYSICAL EXAM
[Normal] : normal gait, coordination grossly intact, no focal deficits and deep tendon reflexes were 2+ and symmetric [de-identified] : Tearful [de-identified] : Very cranial tenderness paraspinal tenderness
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
## 2023-04-12 ENCOUNTER — RX RENEWAL (OUTPATIENT)
Age: 48
End: 2023-04-12

## 2023-05-23 ENCOUNTER — APPOINTMENT (OUTPATIENT)
Dept: INTERNAL MEDICINE | Facility: CLINIC | Age: 48
End: 2023-05-23
Payer: COMMERCIAL

## 2023-05-23 VITALS
TEMPERATURE: 98.8 F | HEIGHT: 60 IN | DIASTOLIC BLOOD PRESSURE: 71 MMHG | SYSTOLIC BLOOD PRESSURE: 106 MMHG | WEIGHT: 141 LBS | BODY MASS INDEX: 27.68 KG/M2 | HEART RATE: 69 BPM | OXYGEN SATURATION: 98 %

## 2023-05-23 DIAGNOSIS — M79.10 MYALGIA, UNSPECIFIED SITE: ICD-10-CM

## 2023-05-23 PROCEDURE — 99212 OFFICE O/P EST SF 10 MIN: CPT

## 2023-05-23 RX ORDER — NEOMYCIN SULFATE, POLYMYXIN B SULFATE, HYDROCORTISONE 3.5; 10000; 1 MG/ML; [USP'U]/ML; MG/ML
1 SOLUTION/ DROPS AURICULAR (OTIC) 4 TIMES DAILY
Qty: 1 | Refills: 0 | Status: DISCONTINUED | COMMUNITY
Start: 2022-08-10 | End: 2023-05-23

## 2023-05-23 RX ORDER — AMITRIPTYLINE HYDROCHLORIDE 10 MG/1
10 TABLET, FILM COATED ORAL
Qty: 60 | Refills: 1 | Status: DISCONTINUED | COMMUNITY
Start: 2023-03-13 | End: 2023-05-23

## 2023-05-23 RX ORDER — SODIUM SULFATE, POTASSIUM SULFATE AND MAGNESIUM SULFATE 1.6; 3.13; 17.5 G/177ML; G/177ML; G/177ML
17.5-3.13-1.6 SOLUTION ORAL TWICE DAILY
Qty: 2 | Refills: 0 | Status: DISCONTINUED | COMMUNITY
Start: 2022-10-13 | End: 2023-05-23

## 2023-05-23 NOTE — ADDENDUM
[FreeTextEntry1] : I, Helga Garcia, acted as a scribe on behalf of Dr. Joseph Fry MD, on 05/23/2023. \par \par All medical entries made by the scribe were at my, Dr. Joseph Fry MD, direction and personally dictated by me on 05/23/2023. I have reviewed the chart and agree that the record accurately reflects my personal performance of the history, physical exam, assessment and plan. I have also personally directed, reviewed, and agreed with the chart.

## 2023-05-23 NOTE — ASSESSMENT
[FreeTextEntry1] : -BP is stable.\par -Mood has been stable.\par -Pt wishes to see Chiropractor. Name provided.

## 2023-05-23 NOTE — HISTORY OF PRESENT ILLNESS
[FreeTextEntry1] : Patient presents for follow-up.  [de-identified] : Patient reports she saw Physical Therapist and has no improvement on Back and Shoulder pain. \par Wishes to see Chiropractor instead.\par Denies any weakness, numbness.\par \par Mood has been stable reports she has a new job. Only took 3 pills of Amitriptyline.

## 2023-07-21 NOTE — ED PROVIDER NOTE - DISPOSITION TYPE
2023    TELEHEALTH EVALUATION -- Audio/Visual (During [facility]- public health emergency)    HPI:    Calvin Payne (:  1972) has requested an audio/video evaluation for the following concern(s):      Chief Complaint   Patient presents with    Neck Pain     Necking pain going into R shoulder, started Tuesday Evening       Denies triggers, was out of town for one night, had cervical fusion 20 years ago with periodic edema and soreness, difficult lifting right arm, can't sleep, wakes up in pain, tried Advil, Tylenol, Ice/Heat. Review of Systems   Musculoskeletal:  Positive for arthralgias and neck pain. Neurological:  Positive for numbness. Allergies   Allergen Reactions    Atorvastatin Other (See Comments)     Chest pain      Olmesartan        PHYSICAL EXAMINATION:  [ INSTRUCTIONS:  \"[x]\" Indicates a positive item  \"[]\" Indicates a negative item  -- DELETE ALL ITEMS NOT EXAMINED]  Vital Signs: (As obtained by patient/caregiver or practitioner observation)    Height  -  5' 6\"           Weight -   220 lb           Blood pressure- 140/60        Heart rate-     Temperature-      Constitutional: [x] Appears well-developed and well-nourished [x] No apparent distress      [] Abnormal-   Mental status  [x] Alert and awake  [x] Oriented to person/place/time [x]Able to follow commands      Eyes:  EOM    [x]  Normal  [] Abnormal-  Sclera  []  Normal  [] Abnormal -         Discharge []  None visible  [] Abnormal -    HENT:   [x] Normocephalic, atraumatic.   [] Abnormal   [] Mouth/Throat: Mucous membranes are moist.     External Ears [x] Normal  [] Abnormal-     Neck: [x] No visualized mass     Pulmonary/Chest: [x] Respiratory effort normal.  [x] No visualized signs of difficulty breathing or respiratory distress        [] Abnormal-      Musculoskeletal:   [] Normal gait with no signs of ataxia         [x] Normal range of motion of neck        [] Abnormal-       Neurological:        [x] No Facial Asymmetry DISCHARGE

## 2024-01-03 ENCOUNTER — APPOINTMENT (OUTPATIENT)
Dept: INTERNAL MEDICINE | Facility: CLINIC | Age: 49
End: 2024-01-03
Payer: COMMERCIAL

## 2024-01-03 ENCOUNTER — LABORATORY RESULT (OUTPATIENT)
Age: 49
End: 2024-01-03

## 2024-01-03 ENCOUNTER — NON-APPOINTMENT (OUTPATIENT)
Age: 49
End: 2024-01-03

## 2024-01-03 VITALS
HEART RATE: 66 BPM | DIASTOLIC BLOOD PRESSURE: 68 MMHG | BODY MASS INDEX: 29.45 KG/M2 | SYSTOLIC BLOOD PRESSURE: 112 MMHG | WEIGHT: 150 LBS | OXYGEN SATURATION: 98 % | TEMPERATURE: 98.2 F | HEIGHT: 60 IN

## 2024-01-03 DIAGNOSIS — F17.210 NICOTINE DEPENDENCE, CIGARETTES, UNCOMPLICATED: ICD-10-CM

## 2024-01-03 DIAGNOSIS — R20.0 ANESTHESIA OF SKIN: ICD-10-CM

## 2024-01-03 DIAGNOSIS — M54.2 CERVICALGIA: ICD-10-CM

## 2024-01-03 DIAGNOSIS — Z00.00 ENCOUNTER FOR GENERAL ADULT MEDICAL EXAMINATION W/OUT ABNORMAL FINDINGS: ICD-10-CM

## 2024-01-03 DIAGNOSIS — R91.1 SOLITARY PULMONARY NODULE: ICD-10-CM

## 2024-01-03 PROCEDURE — 99396 PREV VISIT EST AGE 40-64: CPT | Mod: 25

## 2024-01-03 PROCEDURE — 99214 OFFICE O/P EST MOD 30 MIN: CPT | Mod: 25

## 2024-01-03 PROCEDURE — 36415 COLL VENOUS BLD VENIPUNCTURE: CPT

## 2024-01-03 NOTE — REVIEW OF SYSTEMS
[Earache] : earache [Negative] : Heme/Lymph [FreeTextEntry9] : Neck pain, Hand numbness, Plantar Fasciitis

## 2024-01-03 NOTE — HEALTH RISK ASSESSMENT
[Good] : ~his/her~  mood as  good [Yes] : Yes [No] : In the past 12 months have you used drugs other than those required for medical reasons? No [Current] : Current [FreeTextEntry1] : Health Maintenance, hand numbness [de-identified] : Podiatrist, Physical therapy. [MammogramComments] : Will f/u with GYN

## 2024-01-03 NOTE — HISTORY OF PRESENT ILLNESS
[FreeTextEntry1] : Patient presents for annual physical exam. [de-identified] : Patient c/o sore body particularly on neck and hips. Would also get hand numbness. Denies any stiffness. Notes she has work shift and has been typing a lot for work. Has tried Physical Therapy with no relief. Saw Podiatrist and Diagnosed with Plantar fasciitis. Described as foot and heel pain and radiates to lower back.  Also c/o ear itching. Denies any muffled hearing. Denies any CP, chest tightness or SOB. Denies any abdominal pain, urinary symptom or change in bowel habits. Pt has gained some weight and continues to smoke. Planning to get liposuction in [place] She will f/u with GYN.

## 2024-01-03 NOTE — ADDENDUM
[FreeTextEntry1] : I, Helga Garcia, acted as a scribe on behalf of Dr. Joseph Fry MD, on 01/03/2024.   All medical entries made by the scribe were at my, Dr. Joseph Fry MD, direction and personally dictated by me on 01/03/2024. I have reviewed the chart and agree that the record accurately reflects my personal performance of the history, physical exam, assessment and plan. I have also personally directed, reviewed, and agreed with the chart.

## 2024-01-03 NOTE — ASSESSMENT
[FreeTextEntry1] : Annual Physical Exam -BP is stable. Continue current management. -Check A1c, lipid panel and Vitamin levels. -Hand numbness possibly CTS vs radiclopathy f/u Dr. Blair neeurology -Advsied accupuncture for Neck pain. Cervical spine MRI ordered, as patient has failed PT and chiropractor -Chest CT scan ordered given Hx of lung nodule Discussed weight loss will be beneficial for plantar fascitis. -RTO annually or as needed

## 2024-01-03 NOTE — PHYSICAL EXAM
[No Acute Distress] : no acute distress [Well Nourished] : well nourished [Well Developed] : well developed [Well-Appearing] : well-appearing [Normal Sclera/Conjunctiva] : normal sclera/conjunctiva [PERRL] : pupils equal round and reactive to light [EOMI] : extraocular movements intact [Normal Outer Ear/Nose] : the outer ears and nose were normal in appearance [Normal Oropharynx] : the oropharynx was normal [No JVD] : no jugular venous distention [No Lymphadenopathy] : no lymphadenopathy [Supple] : supple [Thyroid Normal, No Nodules] : the thyroid was normal and there were no nodules present [No Respiratory Distress] : no respiratory distress  [No Accessory Muscle Use] : no accessory muscle use [Clear to Auscultation] : lungs were clear to auscultation bilaterally [Normal Rate] : normal rate  [Regular Rhythm] : with a regular rhythm [Normal S1, S2] : normal S1 and S2 [No Murmur] : no murmur heard [No Carotid Bruits] : no carotid bruits [No Abdominal Bruit] : a ~M bruit was not heard ~T in the abdomen [No Varicosities] : no varicosities [Pedal Pulses Present] : the pedal pulses are present [No Edema] : there was no peripheral edema [No Palpable Aorta] : no palpable aorta [No Extremity Clubbing/Cyanosis] : no extremity clubbing/cyanosis [Soft] : abdomen soft [Non Tender] : non-tender [Non-distended] : non-distended [No Masses] : no abdominal mass palpated [No HSM] : no HSM [Normal Bowel Sounds] : normal bowel sounds [Normal Posterior Cervical Nodes] : no posterior cervical lymphadenopathy [Normal Anterior Cervical Nodes] : no anterior cervical lymphadenopathy [No CVA Tenderness] : no CVA  tenderness [No Joint Swelling] : no joint swelling [Grossly Normal Strength/Tone] : grossly normal strength/tone [No Rash] : no rash [Coordination Grossly Intact] : coordination grossly intact [No Focal Deficits] : no focal deficits [Normal Gait] : normal gait [Deep Tendon Reflexes (DTR)] : deep tendon reflexes were 2+ and symmetric [Normal Affect] : the affect was normal [Normal Insight/Judgement] : insight and judgment were intact [de-identified] : cervical paraspinal tenderness, ttp over the cervical spine

## 2024-01-10 LAB
25(OH)D3 SERPL-MCNC: 28.9 NG/ML
ALBUMIN SERPL ELPH-MCNC: 4.7 G/DL
ALP BLD-CCNC: 64 U/L
ALT SERPL-CCNC: 16 U/L
ANION GAP SERPL CALC-SCNC: 12 MMOL/L
APPEARANCE: CLEAR
AST SERPL-CCNC: 16 U/L
BASOPHILS # BLD AUTO: 0.06 K/UL
BASOPHILS NFR BLD AUTO: 0.7 %
BILIRUB SERPL-MCNC: 0.7 MG/DL
BILIRUBIN URINE: NEGATIVE
BLOOD URINE: NEGATIVE
BUN SERPL-MCNC: 10 MG/DL
CALCIUM SERPL-MCNC: 9.4 MG/DL
CHLORIDE SERPL-SCNC: 105 MMOL/L
CHOLEST SERPL-MCNC: 273 MG/DL
CO2 SERPL-SCNC: 21 MMOL/L
COLOR: YELLOW
CREAT SERPL-MCNC: 0.6 MG/DL
EGFR: 111 ML/MIN/1.73M2
EOSINOPHIL # BLD AUTO: 0.46 K/UL
EOSINOPHIL NFR BLD AUTO: 5.6 %
ESTIMATED AVERAGE GLUCOSE: 108 MG/DL
GLUCOSE QUALITATIVE U: NEGATIVE MG/DL
GLUCOSE SERPL-MCNC: 95 MG/DL
HBA1C MFR BLD HPLC: 5.4 %
HCT VFR BLD CALC: 43.3 %
HDLC SERPL-MCNC: 74 MG/DL
HGB BLD-MCNC: 13.9 G/DL
IMM GRANULOCYTES NFR BLD AUTO: 0.1 %
KETONES URINE: NEGATIVE MG/DL
LDLC SERPL CALC-MCNC: 176 MG/DL
LEUKOCYTE ESTERASE URINE: ABNORMAL
LYMPHOCYTES # BLD AUTO: 2.19 K/UL
LYMPHOCYTES NFR BLD AUTO: 26.7 %
MAN DIFF?: NORMAL
MCHC RBC-ENTMCNC: 30.5 PG
MCHC RBC-ENTMCNC: 32.1 GM/DL
MCV RBC AUTO: 95 FL
MONOCYTES # BLD AUTO: 0.38 K/UL
MONOCYTES NFR BLD AUTO: 4.6 %
NEUTROPHILS # BLD AUTO: 5.1 K/UL
NEUTROPHILS NFR BLD AUTO: 62.3 %
NITRITE URINE: NEGATIVE
NONHDLC SERPL-MCNC: 200 MG/DL
PH URINE: 7.5
PLATELET # BLD AUTO: 310 K/UL
POTASSIUM SERPL-SCNC: 4.4 MMOL/L
PROT SERPL-MCNC: 7.1 G/DL
PROTEIN URINE: NORMAL MG/DL
RBC # BLD: 4.56 M/UL
RBC # FLD: 15 %
SODIUM SERPL-SCNC: 138 MMOL/L
SPECIFIC GRAVITY URINE: 1.03
TRIGL SERPL-MCNC: 135 MG/DL
TSH SERPL-ACNC: 1.45 UIU/ML
UROBILINOGEN URINE: 1 MG/DL
VIT B12 SERPL-MCNC: 1454 PG/ML
WBC # FLD AUTO: 8.2 K/UL

## 2024-01-17 ENCOUNTER — APPOINTMENT (OUTPATIENT)
Dept: CT IMAGING | Facility: CLINIC | Age: 49
End: 2024-01-17
Payer: COMMERCIAL

## 2024-01-17 ENCOUNTER — APPOINTMENT (OUTPATIENT)
Dept: MRI IMAGING | Facility: CLINIC | Age: 49
End: 2024-01-17
Payer: COMMERCIAL

## 2024-01-17 PROCEDURE — 71250 CT THORAX DX C-: CPT

## 2024-01-17 PROCEDURE — 72141 MRI NECK SPINE W/O DYE: CPT

## 2024-01-30 ENCOUNTER — NON-APPOINTMENT (OUTPATIENT)
Age: 49
End: 2024-01-30

## 2024-01-31 DIAGNOSIS — E04.1 NONTOXIC SINGLE THYROID NODULE: ICD-10-CM

## 2024-02-07 ENCOUNTER — APPOINTMENT (OUTPATIENT)
Dept: ULTRASOUND IMAGING | Facility: CLINIC | Age: 49
End: 2024-02-07
Payer: COMMERCIAL

## 2024-02-07 PROCEDURE — 76536 US EXAM OF HEAD AND NECK: CPT

## 2024-02-28 ENCOUNTER — APPOINTMENT (OUTPATIENT)
Dept: PODIATRY | Facility: CLINIC | Age: 49
End: 2024-02-28
Payer: COMMERCIAL

## 2024-02-28 DIAGNOSIS — M77.30 CALCANEAL SPUR, UNSPECIFIED FOOT: ICD-10-CM

## 2024-02-28 PROCEDURE — 99203 OFFICE O/P NEW LOW 30 MIN: CPT | Mod: 25

## 2024-02-28 PROCEDURE — 20550 NJX 1 TENDON SHEATH/LIGAMENT: CPT | Mod: RT

## 2024-02-28 PROCEDURE — 73630 X-RAY EXAM OF FOOT: CPT | Mod: RT

## 2024-03-01 ENCOUNTER — NON-APPOINTMENT (OUTPATIENT)
Age: 49
End: 2024-03-01

## 2024-03-01 ENCOUNTER — APPOINTMENT (OUTPATIENT)
Dept: INTERNAL MEDICINE | Facility: CLINIC | Age: 49
End: 2024-03-01
Payer: COMMERCIAL

## 2024-03-01 VITALS
HEIGHT: 60 IN | WEIGHT: 152 LBS | BODY MASS INDEX: 29.84 KG/M2 | HEART RATE: 78 BPM | DIASTOLIC BLOOD PRESSURE: 70 MMHG | SYSTOLIC BLOOD PRESSURE: 108 MMHG | OXYGEN SATURATION: 99 % | TEMPERATURE: 97.6 F

## 2024-03-01 DIAGNOSIS — Z01.818 ENCOUNTER FOR OTHER PREPROCEDURAL EXAMINATION: ICD-10-CM

## 2024-03-01 PROBLEM — M77.30 CALCANEAL SPUR: Status: ACTIVE | Noted: 2024-02-29

## 2024-03-01 PROCEDURE — 36415 COLL VENOUS BLD VENIPUNCTURE: CPT

## 2024-03-01 PROCEDURE — 93000 ELECTROCARDIOGRAM COMPLETE: CPT

## 2024-03-01 PROCEDURE — 99213 OFFICE O/P EST LOW 20 MIN: CPT | Mod: 25

## 2024-03-01 RX ORDER — BETAMETHASONE DIPROPIONATE 0.5 MG/G
0.05 CREAM, AUGMENTED TOPICAL TWICE DAILY
Qty: 1 | Refills: 0 | Status: DISCONTINUED | COMMUNITY
Start: 2022-08-10 | End: 2024-03-01

## 2024-03-01 RX ORDER — TAMSULOSIN HYDROCHLORIDE 0.4 MG/1
0.4 CAPSULE ORAL
Qty: 30 | Refills: 0 | Status: DISCONTINUED | COMMUNITY
Start: 2022-02-01 | End: 2024-03-01

## 2024-03-01 NOTE — PROCEDURE
[FreeTextEntry1] : X-rays were taken to evaluate for spur or bony irregularities.  X-ray Report: (Bilateral foot - 3 views)  X-rays demonstrate an old retrocalcaneal spur or injury that is asymptomatic. She has no spurs at the inferior calcaneal tubercle bilateral. No osteoporosis.

## 2024-03-01 NOTE — ASSESSMENT
[Patient Optimized for Surgery] : Patient optimized for surgery [FreeTextEntry4] : -Check PT and INR -EKG done today [No Further Testing Recommended] : no further testing recommended

## 2024-03-01 NOTE — ASSESSMENT
[FreeTextEntry1] : Impression: Heel spur syndrome. Plantar fasciitis. Pain.  Treatment: I gave her a series of stretching exercises and home therapy exercises. I referred her for physical therapy. She consented. I prepped both feet with alcohol. I used Ethyl Chloride and through a medial approach I injected the insertional plantar fasciitis with 1 1/2cc's of a combination of Xylocaine and Kenalog-40.  She is going to ice and stretch. I gave her a specific handout for shoe gear that will be much more beneficial over time. I put her out of work. I will see her back in a month for evaluation.

## 2024-03-01 NOTE — HISTORY OF PRESENT ILLNESS
[No Pertinent Cardiac History] : no history of aortic stenosis, atrial fibrillation, coronary artery disease, recent myocardial infarction, or implantable device/pacemaker [No Pertinent Pulmonary History] : no history of asthma, COPD, sleep apnea, or smoking [No Adverse Anesthesia Reaction] : no adverse anesthesia reaction in self or family member [Chronic Anticoagulation] : no chronic anticoagulation [Chronic Kidney Disease] : no chronic kidney disease [Diabetes] : no diabetes [(Patient denies any chest pain, claudication, dyspnea on exertion, orthopnea, palpitations or syncope)] : Patient denies any chest pain, claudication, dyspnea on exertion, orthopnea, palpitations or syncope [FreeTextEntry1] : Abdominoplasty [FreeTextEntry2] : March 13, 2024 [FreeTextEntry4] : Pt went to podiatry and was diagnosed with Plantar fasciitis. She had cortisone injection and advised to rest. Sometimes have SOB secondary to foot pain.  Denies any CP, Chest tightness, coughing or wheezing. Denies any palpitations, lightheadedness or dizziness.

## 2024-03-01 NOTE — PHYSICAL EXAM
[2+] : left foot dorsalis pedis 2+ [Skin Color & Pigmentation] : normal skin color and pigmentation [Skin Turgor] : normal skin turgor [Skin Lesions] : no skin lesions [Sensation] : the sensory exam was normal to light touch and pinprick [No Focal Deficits] : no focal deficits [Deep Tendon Reflexes (DTR)] : deep tendon reflexes were 2+ and symmetric [Motor Exam] : the motor exam was normal [Ankle Swelling (On Exam)] : not present [Varicose Veins Of Lower Extremities] : not present [Delayed in the Left Toes] : capillary refills normal in the left toes [Delayed in the Right Toes] : capillary refills normal in right toes [] : not present [FreeTextEntry3] : Hair growth noted on digits. Proximal to distal cooling is within normal limits.  [de-identified] : She has equinus and tight posterior muscle group. she has medial band insertional plantar fasciitis at the proximal one third of the extent of the plantar fascia especially at the insertion. It is quite sensitive bilateral, about 8/10. [Skin Induration] : no skin induration [Foot Ulcer] : no foot ulcer [Diminished Throughout Right Foot] : normal sensation with monofilament testing throughout right foot [Vibration Dec.] : normal vibratory sensation at the level of the toes [Diminished Throughout Left Foot] : normal sensation with monofilament testing throughout left foot

## 2024-03-01 NOTE — PHYSICAL EXAM
[No Acute Distress] : no acute distress [Well Developed] : well developed [Well Nourished] : well nourished [Well-Appearing] : well-appearing [Normal Sclera/Conjunctiva] : normal sclera/conjunctiva [PERRL] : pupils equal round and reactive to light [EOMI] : extraocular movements intact [Normal Outer Ear/Nose] : the outer ears and nose were normal in appearance [Normal Oropharynx] : the oropharynx was normal [No JVD] : no jugular venous distention [No Lymphadenopathy] : no lymphadenopathy [Supple] : supple [Thyroid Normal, No Nodules] : the thyroid was normal and there were no nodules present [No Respiratory Distress] : no respiratory distress  [No Accessory Muscle Use] : no accessory muscle use [Clear to Auscultation] : lungs were clear to auscultation bilaterally [Normal Rate] : normal rate  [Regular Rhythm] : with a regular rhythm [Normal S1, S2] : normal S1 and S2 [No Carotid Bruits] : no carotid bruits [No Murmur] : no murmur heard [No Abdominal Bruit] : a ~M bruit was not heard ~T in the abdomen [No Varicosities] : no varicosities [Pedal Pulses Present] : the pedal pulses are present [No Edema] : there was no peripheral edema [No Palpable Aorta] : no palpable aorta [No Extremity Clubbing/Cyanosis] : no extremity clubbing/cyanosis [Non Tender] : non-tender [Soft] : abdomen soft [Non-distended] : non-distended [No Masses] : no abdominal mass palpated [No HSM] : no HSM [Normal Bowel Sounds] : normal bowel sounds [Normal Anterior Cervical Nodes] : no anterior cervical lymphadenopathy [Normal Posterior Cervical Nodes] : no posterior cervical lymphadenopathy [No Spinal Tenderness] : no spinal tenderness [No CVA Tenderness] : no CVA  tenderness [Grossly Normal Strength/Tone] : grossly normal strength/tone [No Joint Swelling] : no joint swelling [No Rash] : no rash [Coordination Grossly Intact] : coordination grossly intact [No Focal Deficits] : no focal deficits [Deep Tendon Reflexes (DTR)] : deep tendon reflexes were 2+ and symmetric [Normal Gait] : normal gait [Normal Insight/Judgement] : insight and judgment were intact [Normal Affect] : the affect was normal

## 2024-03-01 NOTE — HISTORY OF PRESENT ILLNESS
[FreeTextEntry1] : Patient presents today complaining of pain bilateral at the heel. She complains of pain 8/10 at both heels. It has been going on for at least 3 months. She complains of post-static dyskinesia. She stands a lot at work. She is having a real hard time making it through the day. She denies any trauma. She complains of pain that is really badly interfering with her lifestyle and not getting any better. She went to another podiatrist who made her orthotics and put her on anti-inflammatories without benefit.

## 2024-03-01 NOTE — ADDENDUM
[FreeTextEntry1] : I, Helga Garcia, acted as a scribe on behalf of Dr. Joseph Fry MD, on 03/01/2024.   All medical entries made by the scribe were at my, Dr. Joseph Fry MD, direction and personally dictated by me on 03/01/2024. I have reviewed the chart and agree that the record accurately reflects my personal performance of the history, physical exam, assessment and plan. I have also personally directed, reviewed, and agreed with the chart.

## 2024-03-02 LAB
ALBUMIN SERPL ELPH-MCNC: 4.8 G/DL
ALP BLD-CCNC: 77 U/L
ALT SERPL-CCNC: 15 U/L
ANION GAP SERPL CALC-SCNC: 14 MMOL/L
APTT BLD: 33.8 SEC
AST SERPL-CCNC: 14 U/L
BASOPHILS # BLD AUTO: 0.05 K/UL
BASOPHILS NFR BLD AUTO: 0.6 %
BILIRUB SERPL-MCNC: 0.3 MG/DL
BUN SERPL-MCNC: 13 MG/DL
CALCIUM SERPL-MCNC: 10.1 MG/DL
CHLORIDE SERPL-SCNC: 104 MMOL/L
CO2 SERPL-SCNC: 22 MMOL/L
CREAT SERPL-MCNC: 0.78 MG/DL
EGFR: 94 ML/MIN/1.73M2
EOSINOPHIL # BLD AUTO: 0.28 K/UL
EOSINOPHIL NFR BLD AUTO: 3.4 %
GLUCOSE SERPL-MCNC: 80 MG/DL
HCG SERPL-MCNC: <1 MIU/ML
HCT VFR BLD CALC: 42 %
HGB BLD-MCNC: 13.5 G/DL
IMM GRANULOCYTES NFR BLD AUTO: 0.4 %
INR PPP: 0.89 RATIO
LYMPHOCYTES # BLD AUTO: 2.35 K/UL
LYMPHOCYTES NFR BLD AUTO: 28.8 %
MAN DIFF?: NORMAL
MCHC RBC-ENTMCNC: 29.5 PG
MCHC RBC-ENTMCNC: 32.1 GM/DL
MCV RBC AUTO: 91.7 FL
MONOCYTES # BLD AUTO: 0.55 K/UL
MONOCYTES NFR BLD AUTO: 6.7 %
NEUTROPHILS # BLD AUTO: 4.9 K/UL
NEUTROPHILS NFR BLD AUTO: 60.1 %
PLATELET # BLD AUTO: 361 K/UL
POTASSIUM SERPL-SCNC: 4.3 MMOL/L
PROT SERPL-MCNC: 7.5 G/DL
PT BLD: 10.1 SEC
RBC # BLD: 4.58 M/UL
RBC # FLD: 14.8 %
SODIUM SERPL-SCNC: 140 MMOL/L
WBC # FLD AUTO: 8.16 K/UL

## 2024-03-26 ENCOUNTER — APPOINTMENT (OUTPATIENT)
Dept: PODIATRY | Facility: CLINIC | Age: 49
End: 2024-03-26
Payer: COMMERCIAL

## 2024-03-26 DIAGNOSIS — M79.672 PAIN IN RIGHT FOOT: ICD-10-CM

## 2024-03-26 DIAGNOSIS — M79.671 PAIN IN RIGHT FOOT: ICD-10-CM

## 2024-03-26 DIAGNOSIS — M72.2 PLANTAR FASCIAL FIBROMATOSIS: ICD-10-CM

## 2024-03-26 PROCEDURE — 99212 OFFICE O/P EST SF 10 MIN: CPT

## 2024-03-26 RX ORDER — DICLOFENAC SODIUM 1% 10 MG/G
1 GEL TOPICAL DAILY
Qty: 1 | Refills: 1 | Status: ACTIVE | COMMUNITY
Start: 2024-03-26 | End: 1900-01-01

## 2024-03-28 PROBLEM — M72.2 PLANTAR FASCIITIS: Status: ACTIVE | Noted: 2024-02-29

## 2024-03-28 PROBLEM — M79.671 PAIN IN BOTH FEET: Status: ACTIVE | Noted: 2024-02-29

## 2024-04-01 NOTE — PHYSICAL EXAM
[2+] : left foot dorsalis pedis 2+ [Skin Color & Pigmentation] : normal skin color and pigmentation [Skin Turgor] : normal skin turgor [Skin Lesions] : no skin lesions [Sensation] : the sensory exam was normal to light touch and pinprick [No Focal Deficits] : no focal deficits [Deep Tendon Reflexes (DTR)] : deep tendon reflexes were 2+ and symmetric [Motor Exam] : the motor exam was normal [Ankle Swelling (On Exam)] : not present [Varicose Veins Of Lower Extremities] : not present [] : not present [Delayed in the Right Toes] : capillary refills normal in right toes [Delayed in the Left Toes] : capillary refills normal in the left toes [FreeTextEntry3] : Hair growth noted on digits. Proximal to distal cooling is within normal limits.  [de-identified] : She has equinus and tight posterior muscle group. she has medial band insertional plantar fasciitis at the proximal one third of the extent of the plantar fascia especially at the insertion. Pain is about a 2 or 3/10, bilateral. [Foot Ulcer] : no foot ulcer [Skin Induration] : no skin induration [Vibration Dec.] : normal vibratory sensation at the level of the toes [Diminished Throughout Left Foot] : normal sensation with monofilament testing throughout left foot [Diminished Throughout Right Foot] : normal sensation with monofilament testing throughout right foot

## 2024-04-01 NOTE — HISTORY OF PRESENT ILLNESS
[FreeTextEntry1] : Patient presents today for bilateral plantar fasciitis and heel spur syndrome. I gave her injections and overall she is significantly improved but still having sensitivity bilateral but dramatically improved. She has been working on the stretching exercises and still with post-static dyskinesia but trending much better.

## 2024-04-01 NOTE — ASSESSMENT
[FreeTextEntry1] : Impression: Plantar fasciitis. Pain.  Treatment: I gave her more stretching exercises to work, which is physical therapy. She will continue the orthotics. I wrote for P2 pain topical medication to use. I extended her out of work for 2 weeks and she could return to work full duty. Follow-up in the office only as needed.

## 2024-07-17 ENCOUNTER — APPOINTMENT (OUTPATIENT)
Dept: PODIATRY | Facility: CLINIC | Age: 49
End: 2024-07-17

## 2024-07-17 DIAGNOSIS — M79.671 PAIN IN RIGHT FOOT: ICD-10-CM

## 2024-07-17 DIAGNOSIS — M72.2 PLANTAR FASCIAL FIBROMATOSIS: ICD-10-CM

## 2024-07-17 DIAGNOSIS — M79.672 PAIN IN RIGHT FOOT: ICD-10-CM

## 2024-07-17 PROCEDURE — 99212 OFFICE O/P EST SF 10 MIN: CPT

## 2024-11-04 ENCOUNTER — APPOINTMENT (OUTPATIENT)
Dept: INTERNAL MEDICINE | Facility: CLINIC | Age: 49
End: 2024-11-04
Payer: COMMERCIAL

## 2024-11-04 VITALS
HEIGHT: 59 IN | SYSTOLIC BLOOD PRESSURE: 108 MMHG | OXYGEN SATURATION: 98 % | BODY MASS INDEX: 30.64 KG/M2 | DIASTOLIC BLOOD PRESSURE: 70 MMHG | TEMPERATURE: 97.6 F | HEART RATE: 69 BPM | WEIGHT: 152 LBS

## 2024-11-04 DIAGNOSIS — M54.2 CERVICALGIA: ICD-10-CM

## 2024-11-04 PROCEDURE — G2211 COMPLEX E/M VISIT ADD ON: CPT

## 2024-11-04 PROCEDURE — 99213 OFFICE O/P EST LOW 20 MIN: CPT

## 2024-11-04 RX ORDER — TIZANIDINE HYDROCHLORIDE 4 MG/1
4 CAPSULE ORAL
Qty: 90 | Refills: 0 | Status: ACTIVE | COMMUNITY
Start: 2024-11-04 | End: 1900-01-01

## 2025-01-07 ENCOUNTER — APPOINTMENT (OUTPATIENT)
Dept: PODIATRY | Facility: CLINIC | Age: 50
End: 2025-01-07

## 2025-01-07 DIAGNOSIS — M79.671 PAIN IN RIGHT FOOT: ICD-10-CM

## 2025-01-07 DIAGNOSIS — M79.672 PAIN IN RIGHT FOOT: ICD-10-CM

## 2025-01-07 DIAGNOSIS — M72.2 PLANTAR FASCIAL FIBROMATOSIS: ICD-10-CM

## 2025-01-07 PROCEDURE — 99213 OFFICE O/P EST LOW 20 MIN: CPT

## 2025-01-07 RX ORDER — CELECOXIB 200 MG/1
200 CAPSULE ORAL DAILY
Qty: 14 | Refills: 0 | Status: ACTIVE | COMMUNITY
Start: 2025-01-07 | End: 1900-01-01